# Patient Record
Sex: FEMALE | Race: WHITE | NOT HISPANIC OR LATINO | Employment: FULL TIME | URBAN - METROPOLITAN AREA
[De-identification: names, ages, dates, MRNs, and addresses within clinical notes are randomized per-mention and may not be internally consistent; named-entity substitution may affect disease eponyms.]

---

## 2021-07-04 ENCOUNTER — OFFICE VISIT (OUTPATIENT)
Dept: URGENT CARE | Facility: CLINIC | Age: 36
End: 2021-07-04
Payer: COMMERCIAL

## 2021-07-04 VITALS
BODY MASS INDEX: 24.34 KG/M2 | WEIGHT: 128.9 LBS | OXYGEN SATURATION: 100 % | SYSTOLIC BLOOD PRESSURE: 108 MMHG | HEART RATE: 80 BPM | RESPIRATION RATE: 16 BRPM | DIASTOLIC BLOOD PRESSURE: 60 MMHG | TEMPERATURE: 97.7 F | HEIGHT: 61 IN

## 2021-07-04 DIAGNOSIS — J02.0 PHARYNGITIS DUE TO STREPTOCOCCUS SPECIES: ICD-10-CM

## 2021-07-04 DIAGNOSIS — A05.9 FOOD POISONING: ICD-10-CM

## 2021-07-04 LAB
INT CON NEG: NEGATIVE
INT CON POS: POSITIVE
S PYO AG THROAT QL: POSITIVE

## 2021-07-04 PROCEDURE — 99203 OFFICE O/P NEW LOW 30 MIN: CPT | Performed by: PHYSICIAN ASSISTANT

## 2021-07-04 PROCEDURE — 87880 STREP A ASSAY W/OPTIC: CPT | Performed by: PHYSICIAN ASSISTANT

## 2021-07-04 RX ORDER — AMOXICILLIN 500 MG/1
500 CAPSULE ORAL 2 TIMES DAILY
Qty: 20 CAPSULE | Refills: 0 | Status: SHIPPED | OUTPATIENT
Start: 2021-07-04 | End: 2021-07-14

## 2021-07-04 ASSESSMENT — ENCOUNTER SYMPTOMS
DIARRHEA: 0
MYALGIAS: 0
EYE PAIN: 0
NAUSEA: 1
DIZZINESS: 0
VOMITING: 1
HEADACHES: 1
SINUS PAIN: 0
PALPITATIONS: 0
ABDOMINAL PAIN: 1
SORE THROAT: 1
COUGH: 0
BLURRED VISION: 0
FEVER: 0
SHORTNESS OF BREATH: 0
CHILLS: 0

## 2021-07-04 NOTE — LETTER
July 4, 2021         Patient: Farzad Jeffries   YOB: 1985   Date of Visit: 7/4/2021           To Whom it May Concern:    Farzad Jeffries was seen in my clinic on 7/4/2021. She tested positive for strep throat.  She is considered contagious until she has been on the antibiotics for a full 24 hours.  She will be started on antibiotics this morning.    If you have any questions or concerns, please don't hesitate to call.        Sincerely,           Kristie Vazquez P.A.-C.  Electronically Signed

## 2021-07-04 NOTE — PROGRESS NOTES
"Subjective:      Farzad Jeffries is a 35 y.o. female who presents with Pharyngitis (x last night ) and Emesis (After eating last night. 5x. )    HPI:  Farzad Jeffries is a 35 y.o. female who presents today for evaluation of nausea and vomiting.  Patient reports that she is currently staying at the [facility].  She states that last night she ate sushi.  Fairly shortly after she got back to her room she started to feel some pain in her throat and feeling as though her throat was \"closing\".  Within an hour or so after starting to get that feeling she started to feel nauseous and had vomiting.  States that she vomited at least 5 times.  Last episode of vomiting was approximately 4.5 hours ago.  She is no longer feeling nauseous.  She did have some upper abdominal pain associated with the nausea and vomiting but this has also passed.  She had one episode of mildly loose stool but no real diarrhea.  No fever/chills.  She has not taken any medications for her symptoms.  Patient denies any close sick contacts but does note that she works as a  and so is often around different types of people.      Review of Systems   Constitutional: Negative for chills, fever and malaise/fatigue.   HENT: Positive for sore throat. Negative for congestion, ear pain and sinus pain.    Eyes: Negative for blurred vision and pain.   Respiratory: Negative for cough and shortness of breath.    Cardiovascular: Negative for chest pain and palpitations.   Gastrointestinal: Positive for abdominal pain, nausea and vomiting. Negative for diarrhea.   Musculoskeletal: Negative for myalgias.   Skin: Negative for rash.   Neurological: Positive for headaches. Negative for dizziness.       PMH:  has no past medical history on file.  MEDS: No current outpatient medications on file.  ALLERGIES: No Known Allergies  SURGHX: No past surgical history on file.  SOCHX:  reports that she has never smoked. She has never used smokeless tobacco. She " "reports current alcohol use. She reports that she does not use drugs.  FH: Family history was reviewed, no pertinent findings to report     Objective:     Pulse 80   Temp 36.5 °C (97.7 °F)   Resp 16   Ht 1.549 m (5' 1\")   Wt 58.5 kg (128 lb 14.4 oz)   SpO2 100%   BMI 24.36 kg/m²      Physical Exam  Constitutional:       Appearance: She is well-developed.   HENT:      Head: Normocephalic and atraumatic.      Right Ear: Tympanic membrane, ear canal and external ear normal.      Left Ear: Tympanic membrane, ear canal and external ear normal.      Nose: Nose normal.      Mouth/Throat:      Lips: Pink.      Mouth: Mucous membranes are moist.      Pharynx: Uvula midline. Posterior oropharyngeal erythema present. No pharyngeal swelling, oropharyngeal exudate or uvula swelling.   Eyes:      Conjunctiva/sclera: Conjunctivae normal.      Pupils: Pupils are equal, round, and reactive to light.   Cardiovascular:      Rate and Rhythm: Normal rate and regular rhythm.      Heart sounds: Normal heart sounds. No murmur heard.     Pulmonary:      Effort: Pulmonary effort is normal.      Breath sounds: Normal breath sounds. No decreased breath sounds, wheezing, rhonchi or rales.   Musculoskeletal:      Cervical back: Normal range of motion.   Lymphadenopathy:      Cervical: Cervical adenopathy present.   Skin:     General: Skin is warm and dry.      Capillary Refill: Capillary refill takes less than 2 seconds.   Neurological:      Mental Status: She is alert and oriented to person, place, and time.   Psychiatric:         Behavior: Behavior normal.         Judgment: Judgment normal.         POCT Rapid Strep A - POSITIVE     Assessment/Plan:     1. Pharyngitis due to Streptococcus species  - POCT Rapid Strep A  - amoxicillin (AMOXIL) 500 MG Cap; Take 1 capsule by mouth 2 times a day for 10 days.  Dispense: 20 capsule; Refill: 0  -Supportive care discussed to include salt water gargles, throat lozenges, and increased fluid " intake    2. Food poisoning  -Patient no longer complaining of any nausea.  It has been ever since she has had any vomiting.  She is able to keep fluids down.  Recommend that she continue to increase her fluid intake and avoid any raw food or spicy food.              Differential Diagnosis, natural history, and supportive care discussed. Return to the Urgent Care or follow up with your PCP if symptoms fail to resolve, or for any new or worsening symptoms. Emergency room precautions discussed. Patient and/or family appears understanding of information.